# Patient Record
Sex: MALE | Race: WHITE | NOT HISPANIC OR LATINO | Employment: FULL TIME | URBAN - NONMETROPOLITAN AREA
[De-identification: names, ages, dates, MRNs, and addresses within clinical notes are randomized per-mention and may not be internally consistent; named-entity substitution may affect disease eponyms.]

---

## 2018-12-17 ENCOUNTER — APPOINTMENT (OUTPATIENT)
Dept: LAB | Facility: HOSPITAL | Age: 23
End: 2018-12-17

## 2019-02-23 ENCOUNTER — APPOINTMENT (OUTPATIENT)
Dept: GENERAL RADIOLOGY | Facility: HOSPITAL | Age: 24
End: 2019-02-23

## 2019-02-23 ENCOUNTER — HOSPITAL ENCOUNTER (EMERGENCY)
Facility: HOSPITAL | Age: 24
Discharge: HOME | End: 2019-02-23
Attending: STUDENT IN AN ORGANIZED HEALTH CARE EDUCATION/TRAINING PROGRAM | Admitting: STUDENT IN AN ORGANIZED HEALTH CARE EDUCATION/TRAINING PROGRAM

## 2019-02-23 VITALS
TEMPERATURE: 98 F | RESPIRATION RATE: 18 BRPM | OXYGEN SATURATION: 96 % | SYSTOLIC BLOOD PRESSURE: 111 MMHG | DIASTOLIC BLOOD PRESSURE: 62 MMHG | HEART RATE: 90 BPM

## 2019-02-23 DIAGNOSIS — S81.011A KNEE LACERATION, RIGHT, INITIAL ENCOUNTER: ICD-10-CM

## 2019-02-23 DIAGNOSIS — S42.292A FRACTURE OF HUMERAL HEAD, CLOSED, LEFT, INITIAL ENCOUNTER: ICD-10-CM

## 2019-02-23 DIAGNOSIS — S42.92XA TRAUMATIC CLOSED DISPLACED FRACTURE OF LEFT SHOULDER WITH ANTERIOR DISLOCATION, INITIAL ENCOUNTER: Primary | ICD-10-CM

## 2019-02-23 PROCEDURE — 90715 TDAP VACCINE 7 YRS/> IM: CPT | Performed by: STUDENT IN AN ORGANIZED HEALTH CARE EDUCATION/TRAINING PROGRAM

## 2019-02-23 PROCEDURE — 73030 X-RAY EXAM OF SHOULDER: CPT

## 2019-02-23 PROCEDURE — 25010000002 TDAP 5-2.5-18.5 LF-MCG/0.5 SUSPENSION: Performed by: STUDENT IN AN ORGANIZED HEALTH CARE EDUCATION/TRAINING PROGRAM

## 2019-02-23 PROCEDURE — 73562 X-RAY EXAM OF KNEE 3: CPT

## 2019-02-23 PROCEDURE — 90471 IMMUNIZATION ADMIN: CPT | Performed by: STUDENT IN AN ORGANIZED HEALTH CARE EDUCATION/TRAINING PROGRAM

## 2019-02-23 PROCEDURE — 99283 EMERGENCY DEPT VISIT LOW MDM: CPT

## 2019-02-23 RX ORDER — OXYCODONE HYDROCHLORIDE AND ACETAMINOPHEN 5; 325 MG/1; MG/1
1 TABLET ORAL ONCE
Status: COMPLETED | OUTPATIENT
Start: 2019-02-23 | End: 2019-02-23

## 2019-02-23 RX ORDER — OXYCODONE HYDROCHLORIDE AND ACETAMINOPHEN 5; 325 MG/1; MG/1
1 TABLET ORAL EVERY 6 HOURS PRN
Qty: 10 TABLET | Refills: 0 | OUTPATIENT
Start: 2019-02-23 | End: 2020-07-20

## 2019-02-23 RX ORDER — CEPHALEXIN 500 MG/1
500 CAPSULE ORAL 4 TIMES DAILY
Qty: 20 CAPSULE | Refills: 0 | OUTPATIENT
Start: 2019-02-23 | End: 2020-07-20

## 2019-02-23 RX ORDER — CEPHALEXIN 250 MG/1
500 CAPSULE ORAL ONCE
Status: COMPLETED | OUTPATIENT
Start: 2019-02-23 | End: 2019-02-23

## 2019-02-23 RX ORDER — ONDANSETRON 4 MG/1
4 TABLET, ORALLY DISINTEGRATING ORAL ONCE
Status: COMPLETED | OUTPATIENT
Start: 2019-02-23 | End: 2019-02-23

## 2019-02-23 RX ADMIN — OXYCODONE HYDROCHLORIDE AND ACETAMINOPHEN 1 TABLET: 5; 325 TABLET ORAL at 01:42

## 2019-02-23 RX ADMIN — ONDANSETRON 4 MG: 4 TABLET, ORALLY DISINTEGRATING ORAL at 01:43

## 2019-02-23 RX ADMIN — CEPHALEXIN 500 MG: 250 CAPSULE ORAL at 03:44

## 2019-02-23 RX ADMIN — TETANUS TOXOID, REDUCED DIPHTHERIA TOXOID AND ACELLULAR PERTUSSIS VACCINE, ADSORBED 0.5 ML: 5; 2.5; 8; 8; 2.5 SUSPENSION INTRAMUSCULAR at 03:45
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
## 2019-02-26 ENCOUNTER — OFFICE VISIT (OUTPATIENT)
Dept: ORTHOPEDIC SURGERY | Facility: CLINIC | Age: 24
End: 2019-02-26

## 2019-02-26 VITALS — BODY MASS INDEX: 35.65 KG/M2 | WEIGHT: 249 LBS | RESPIRATION RATE: 18 BRPM | HEIGHT: 70 IN

## 2019-02-26 DIAGNOSIS — S43.005A DISLOCATION OF LEFT SHOULDER JOINT, INITIAL ENCOUNTER: ICD-10-CM

## 2019-02-26 DIAGNOSIS — S42.252A CLOSED DISPLACED FRACTURE OF GREATER TUBEROSITY OF LEFT HUMERUS, INITIAL ENCOUNTER: ICD-10-CM

## 2019-02-26 DIAGNOSIS — R52 PAIN: Primary | ICD-10-CM

## 2019-02-26 PROCEDURE — 99204 OFFICE O/P NEW MOD 45 MIN: CPT | Performed by: ORTHOPAEDIC SURGERY

## 2019-02-26 RX ORDER — BENZONATATE 100 MG/1
100-200 CAPSULE ORAL
COMMUNITY
Start: 2016-07-08 | End: 2019-02-26

## 2019-02-26 RX ORDER — ALBUTEROL SULFATE 90 UG/1
2 AEROSOL, METERED RESPIRATORY (INHALATION)
COMMUNITY
Start: 2016-07-16 | End: 2019-02-26 | Stop reason: SDUPTHER
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
## 2019-03-11 DIAGNOSIS — S42.252A CLOSED DISPLACED FRACTURE OF GREATER TUBEROSITY OF LEFT HUMERUS, INITIAL ENCOUNTER: Primary | ICD-10-CM

## 2019-03-12 ENCOUNTER — OFFICE VISIT (OUTPATIENT)
Dept: ORTHOPEDIC SURGERY | Facility: CLINIC | Age: 24
End: 2019-03-12

## 2019-03-12 VITALS — HEIGHT: 70 IN | BODY MASS INDEX: 35.65 KG/M2 | WEIGHT: 249 LBS | RESPIRATION RATE: 18 BRPM

## 2019-03-12 DIAGNOSIS — S42.252D CLOSED DISPLACED FRACTURE OF GREATER TUBEROSITY OF LEFT HUMERUS WITH ROUTINE HEALING, SUBSEQUENT ENCOUNTER: Primary | ICD-10-CM

## 2019-03-12 PROCEDURE — 99213 OFFICE O/P EST LOW 20 MIN: CPT | Performed by: ORTHOPAEDIC SURGERY

## 2019-03-12 NOTE — PROGRESS NOTES
Subjective   Patient ID: Scott Louis is a 24 y.o. male  Follow-up and Pain of the Left Shoulder (Patient is here today for his CT results. He states there is not pain unless he tries to lift the arm.)             History of Present Illness  No pain left shoulder, had CT scan done at [facility] on March 4 which showed no associated glenoid fracture and minimal displacement of the greater tuberosity fracture.  He is very comfortable tolerating his light duty work.      Review of Systems   Constitutional: Negative for fever.   HENT: Negative for voice change.    Eyes: Negative for visual disturbance.   Respiratory: Negative for shortness of breath.    Cardiovascular: Negative for chest pain.   Gastrointestinal: Negative for abdominal distention and abdominal pain.   Genitourinary: Negative for dysuria.   Musculoskeletal: Positive for arthralgias. Negative for gait problem and joint swelling.   Skin: Negative for rash.   Neurological: Negative for speech difficulty.   Hematological: Does not bruise/bleed easily.   Psychiatric/Behavioral: Negative for confusion.       History reviewed. No pertinent past medical history.     History reviewed. No pertinent surgical history.    History reviewed. No pertinent family history.    Social History     Socioeconomic History   • Marital status: Single     Spouse name: Not on file   • Number of children: Not on file   • Years of education: Not on file   • Highest education level: Not on file   Social Needs   • Financial resource strain: Not on file   • Food insecurity - worry: Not on file   • Food insecurity - inability: Not on file   • Transportation needs - medical: Not on file   • Transportation needs - non-medical: Not on file   Occupational History   • Not on file   Tobacco Use   • Smoking status: Never Smoker   • Smokeless tobacco: Never Used   Substance and Sexual Activity   • Alcohol use: No     Frequency: Never   • Drug use: No   • Sexual activity: Defer   Other Topics  "Concern   • Not on file   Social History Narrative   • Not on file       I have reviewed all of the above social hx, family hx, surgical hx, medications, allergies & ROS and confirm that it is accurate.    No Known Allergies      Current Outpatient Medications:   •  cephalexin (KEFLEX) 500 MG capsule, Take 1 capsule by mouth 4 (Four) Times a Day., Disp: 20 capsule, Rfl: 0  •  oxyCODONE-acetaminophen (PERCOCET) 5-325 MG per tablet, Take 1 tablet by mouth Every 6 (Six) Hours As Needed for Severe Pain ., Disp: 10 tablet, Rfl: 0    Objective   Resp 18   Ht 177.8 cm (70\")   Wt 113 kg (249 lb)   BMI 35.73 kg/m²    Physical Exam  Constitutional: Patient is oriented to person, place, and time. Patient appears well-developed and well-nourished.   HENT:Head: Normocephalic and atraumatic.   Eyes: EOM are normal. Pupils are equal, round, and reactive to light.   Neck: Normal range of motion. Neck supple.   Cardiovascular: Normal rate.    Pulmonary/Chest: Effort normal and breath sounds normal.   Abdominal: Soft.   Neurological: Patient is alert and oriented to person, place, and time.   Skin: Skin is warm and dry.   Psychiatric: Patient has a normal mood and affect.   Nursing note and vitals reviewed.       [unfilled]   Left shoulder with minimal pain neurovascularly intact ecchymosis is resolving no associated neck pain paresthesias or other radicular signs    Assessment/Plan   Review of Radiographic Studies:    Radiographic images today of fracture I personally viewed and confirm satisfactory alignment.      Procedures     Scott was seen today for follow-up and pain.    Diagnoses and all orders for this visit:    Closed displaced fracture of greater tuberosity of left humerus with routine healing, subsequent encounter       Orthopedic activities reviewed and patient expressed appreciation, Risk, benefits, and merits of treatment alternatives reviewed with the patient and questions answered and Work status form completed " and provided to patient      Recommendations/Plan:   Work/Activity Status: No use of involved extremity    Patient agreeable to call or return sooner for any concerns.         Reviewed the patient's CT scan done at [facility] and explained to him the nature of the fracture natural history treatment options and reviewed expectations    Impression:  Status post left nondominant shoulder dislocation with minimally displaced comminuted greater tuberosity fracture  Plan:  Continue modified duty no use of left arm at work follow-up in 1 week x-ray left shoulder at that time then refer to physical therapy

## 2019-03-18 DIAGNOSIS — S42.252D CLOSED DISPLACED FRACTURE OF GREATER TUBEROSITY OF LEFT HUMERUS WITH ROUTINE HEALING, SUBSEQUENT ENCOUNTER: Primary | ICD-10-CM

## 2019-03-19 ENCOUNTER — OFFICE VISIT (OUTPATIENT)
Dept: ORTHOPEDIC SURGERY | Facility: CLINIC | Age: 24
End: 2019-03-19

## 2019-03-19 VITALS — WEIGHT: 249 LBS | BODY MASS INDEX: 35.65 KG/M2 | HEIGHT: 70 IN | RESPIRATION RATE: 18 BRPM

## 2019-03-19 DIAGNOSIS — S42.252D CLOSED DISPLACED FRACTURE OF GREATER TUBEROSITY OF LEFT HUMERUS WITH ROUTINE HEALING, SUBSEQUENT ENCOUNTER: Primary | ICD-10-CM

## 2019-03-19 PROCEDURE — 99213 OFFICE O/P EST LOW 20 MIN: CPT | Performed by: ORTHOPAEDIC SURGERY

## 2019-03-19 NOTE — PROGRESS NOTES
Subjective   Patient ID: Scott Louis is a 24 y.o. male  Follow-up and Pain of the Left Shoulder (Patient is here today for a 1 week follow up on his left shoulder fracture, he states the shoulder feels the same as it has been. His pain level is 5/10.)             History of Present Illness  Left shoulder pain much improved less bruising has been compliant with instructions doing modified duty.  Here for follow-up of his nondisplaced greater tuberosity fracture left shoulder      Review of Systems   Constitutional: Negative for fever.   HENT: Negative for voice change.    Eyes: Negative for visual disturbance.   Respiratory: Negative for shortness of breath.    Cardiovascular: Negative for chest pain.   Gastrointestinal: Negative for abdominal distention and abdominal pain.   Genitourinary: Negative for dysuria.   Musculoskeletal: Positive for arthralgias. Negative for gait problem and joint swelling.   Skin: Negative for rash.   Neurological: Negative for speech difficulty.   Hematological: Does not bruise/bleed easily.   Psychiatric/Behavioral: Negative for confusion.       History reviewed. No pertinent past medical history.     History reviewed. No pertinent surgical history.    History reviewed. No pertinent family history.    Social History     Socioeconomic History   • Marital status: Single     Spouse name: Not on file   • Number of children: Not on file   • Years of education: Not on file   • Highest education level: Not on file   Social Needs   • Financial resource strain: Not on file   • Food insecurity - worry: Not on file   • Food insecurity - inability: Not on file   • Transportation needs - medical: Not on file   • Transportation needs - non-medical: Not on file   Occupational History   • Not on file   Tobacco Use   • Smoking status: Never Smoker   • Smokeless tobacco: Never Used   Substance and Sexual Activity   • Alcohol use: No     Frequency: Never   • Drug use: No   • Sexual activity: Defer  "  Other Topics Concern   • Not on file   Social History Narrative   • Not on file       I have reviewed all of the above social hx, family hx, surgical hx, medications, allergies & ROS and confirm that it is accurate.    No Known Allergies      Current Outpatient Medications:   •  cephalexin (KEFLEX) 500 MG capsule, Take 1 capsule by mouth 4 (Four) Times a Day., Disp: 20 capsule, Rfl: 0  •  oxyCODONE-acetaminophen (PERCOCET) 5-325 MG per tablet, Take 1 tablet by mouth Every 6 (Six) Hours As Needed for Severe Pain ., Disp: 10 tablet, Rfl: 0    Objective   Resp 18   Ht 177.8 cm (70\")   Wt 113 kg (249 lb)   BMI 35.73 kg/m²    Physical Exam  Constitutional: Patient is oriented to person, place, and time. Patient appears well-developed and well-nourished.   HENT:Head: Normocephalic and atraumatic.   Eyes: EOM are normal. Pupils are equal, round, and reactive to light.   Neck: Normal range of motion. Neck supple.   Cardiovascular: Normal rate.    Pulmonary/Chest: Effort normal and breath sounds normal.   Abdominal: Soft.   Neurological: Patient is alert and oriented to person, place, and time.   Skin: Skin is warm and dry.   Psychiatric: Patient has a normal mood and affect.   Nursing note and vitals reviewed.       [unfilled]   Left shoulder with minimal tenderness less ecchymosis no swelling no tenderness AC joint or proximal biceps tendon left arm neurovascularly intact    Assessment/Plan   Review of Radiographic Studies:    Radiographic images today of fracture I personally viewed and confirm satisfactory alignment.      Procedures     Scott was seen today for follow-up and pain.    Diagnoses and all orders for this visit:    Closed displaced fracture of greater tuberosity of left humerus with routine healing, subsequent encounter  -     Ambulatory Referral to Physical Therapy Evaluate and treat       Physical therapy referral given      Recommendations/Plan:   Work/Activity Status: No use of involved " extremity    Patient agreeable to call or return sooner for any concerns.             Impression:  Healing left proximal humeral minimally displaced slightly comminuted greater tuberosity fracture status post dislocation    Plan: therapy referral, no use of left arm at work, recheck in 4-6 weeks x-rays left shoulder at that time

## 2019-04-24 DIAGNOSIS — S42.252D CLOSED DISPLACED FRACTURE OF GREATER TUBEROSITY OF LEFT HUMERUS WITH ROUTINE HEALING, SUBSEQUENT ENCOUNTER: Primary | ICD-10-CM

## 2019-04-25 ENCOUNTER — OFFICE VISIT (OUTPATIENT)
Dept: ORTHOPEDIC SURGERY | Facility: CLINIC | Age: 24
End: 2019-04-25

## 2019-04-25 VITALS — WEIGHT: 253 LBS | BODY MASS INDEX: 36.22 KG/M2 | HEIGHT: 70 IN | RESPIRATION RATE: 18 BRPM

## 2019-04-25 DIAGNOSIS — S42.252D CLOSED DISPLACED FRACTURE OF GREATER TUBEROSITY OF LEFT HUMERUS WITH ROUTINE HEALING, SUBSEQUENT ENCOUNTER: Primary | ICD-10-CM

## 2019-04-25 PROCEDURE — 99213 OFFICE O/P EST LOW 20 MIN: CPT | Performed by: ORTHOPAEDIC SURGERY

## 2019-04-25 NOTE — PROGRESS NOTES
Subjective   Patient ID: Scott Louis is a 24 y.o. male  Follow-up of the Left Shoulder (Patient is here today for a follow up on his left shoulder fracture, he denies any pain today.)             History of Present Illness    Pain level much improved reached full range of motion within the last week progressing with strength but still feels weakness with external rotation of the left upper arm.    Review of Systems   Constitutional: Negative for fever.   HENT: Negative for voice change.    Eyes: Negative for visual disturbance.   Respiratory: Negative for shortness of breath.    Cardiovascular: Negative for chest pain.   Gastrointestinal: Negative for abdominal distention and abdominal pain.   Genitourinary: Negative for dysuria.   Musculoskeletal: Negative for arthralgias, gait problem and joint swelling.   Skin: Negative for rash.   Neurological: Negative for speech difficulty.   Hematological: Does not bruise/bleed easily.   Psychiatric/Behavioral: Negative for confusion.       History reviewed. No pertinent past medical history.     History reviewed. No pertinent surgical history.    History reviewed. No pertinent family history.    Social History     Socioeconomic History   • Marital status: Single     Spouse name: Not on file   • Number of children: Not on file   • Years of education: Not on file   • Highest education level: Not on file   Tobacco Use   • Smoking status: Never Smoker   • Smokeless tobacco: Never Used   Substance and Sexual Activity   • Alcohol use: No     Frequency: Never   • Drug use: No   • Sexual activity: Defer       I have reviewed all of the above social hx, family hx, surgical hx, medications, allergies & ROS and confirm that it is accurate.    No Known Allergies      Current Outpatient Medications:   •  cephalexin (KEFLEX) 500 MG capsule, Take 1 capsule by mouth 4 (Four) Times a Day., Disp: 20 capsule, Rfl: 0  •  oxyCODONE-acetaminophen (PERCOCET) 5-325 MG per tablet, Take 1 tablet by  "mouth Every 6 (Six) Hours As Needed for Severe Pain ., Disp: 10 tablet, Rfl: 0    Objective   Resp 18   Ht 177.8 cm (70\")   Wt 115 kg (253 lb)   BMI 36.30 kg/m²    Physical Exam  Constitutional: Patient is oriented to person, place, and time. Patient appears well-developed and well-nourished.   HENT:Head: Normocephalic and atraumatic.   Eyes: EOM are normal. Pupils are equal, round, and reactive to light.   Neck: Normal range of motion. Neck supple.   Cardiovascular: Normal rate.    Pulmonary/Chest: Effort normal and breath sounds normal.   Abdominal: Soft.   Neurological: Patient is alert and oriented to person, place, and time.   Skin: Skin is warm and dry.   Psychiatric: Patient has a normal mood and affect.   Nursing note and vitals reviewed.       [unfilled]   Left shoulder: Full active range of motion, only grade 4+ strength with supraspinatus resistance, infraspinatus teres minor subscap L5 over 5.    Assessment/Plan   Review of Radiographic Studies:    Indication to evaluate fracture healing, and compared with prior imaging, shows interm fracture healing, callus formation and or periostitis in continued good position and alignment.      Procedures     Scott was seen today for follow-up.    Diagnoses and all orders for this visit:    Closed displaced fracture of greater tuberosity of left humerus with routine healing, subsequent encounter       Physical therapy referral given and Work status form completed and provided to patient      Recommendations/Plan:   Work/Activity Status: Continue light duty restrictions for 3 more weeks    Patient agreeable to call or return sooner for any concerns.             Impression:  Healed left nondominant shoulder greater tuberosity minimally displaced avulsion fracture status post dislocation  Plan:  Continue work duty restrictions 3 weeks recheck at that point no x-rays probable released to full duty work depending on progress with therapy  "

## 2019-05-15 DIAGNOSIS — S42.252D CLOSED DISPLACED FRACTURE OF GREATER TUBEROSITY OF LEFT HUMERUS WITH ROUTINE HEALING, SUBSEQUENT ENCOUNTER: Primary | ICD-10-CM

## 2019-05-16 ENCOUNTER — OFFICE VISIT (OUTPATIENT)
Dept: ORTHOPEDIC SURGERY | Facility: CLINIC | Age: 24
End: 2019-05-16

## 2019-05-16 VITALS — RESPIRATION RATE: 18 BRPM | BODY MASS INDEX: 36.22 KG/M2 | HEIGHT: 70 IN | WEIGHT: 253 LBS

## 2019-05-16 DIAGNOSIS — S42.252D CLOSED DISPLACED FRACTURE OF GREATER TUBEROSITY OF LEFT HUMERUS WITH ROUTINE HEALING, SUBSEQUENT ENCOUNTER: Primary | ICD-10-CM

## 2019-05-16 PROCEDURE — 99213 OFFICE O/P EST LOW 20 MIN: CPT | Performed by: ORTHOPAEDIC SURGERY

## 2019-05-16 NOTE — PROGRESS NOTES
Subjective   Patient ID: Scott Louis is a 24 y.o. male  Follow-up of the Left Shoulder (Patient is here today for a follow up on his left shoulder, he states he doing great and denies any pain.)             History of Present Illness  Patient returns 3 months status post minimally displaced greater tuberosity fracture left nondominant shoulder, he feels minimal pain full range feels no difference in strength from left to right shoulder and would like to return to his regular work duty.      Review of Systems   Constitutional: Negative for fever.   HENT: Negative for voice change.    Eyes: Negative for visual disturbance.   Respiratory: Negative for shortness of breath.    Cardiovascular: Negative for chest pain.   Gastrointestinal: Negative for abdominal distention and abdominal pain.   Genitourinary: Negative for dysuria.   Musculoskeletal: Negative for arthralgias, gait problem and joint swelling.   Skin: Negative for rash.   Neurological: Negative for speech difficulty.   Hematological: Does not bruise/bleed easily.   Psychiatric/Behavioral: Negative for confusion.       History reviewed. No pertinent past medical history.     History reviewed. No pertinent surgical history.    History reviewed. No pertinent family history.    Social History     Socioeconomic History   • Marital status: Single     Spouse name: Not on file   • Number of children: Not on file   • Years of education: Not on file   • Highest education level: Not on file   Tobacco Use   • Smoking status: Never Smoker   • Smokeless tobacco: Never Used   Substance and Sexual Activity   • Alcohol use: No     Frequency: Never   • Drug use: No   • Sexual activity: Defer       I have reviewed all of the above social hx, family hx, surgical hx, medications, allergies & ROS and confirm that it is accurate.    No Known Allergies      Current Outpatient Medications:   •  cephalexin (KEFLEX) 500 MG capsule, Take 1 capsule by mouth 4 (Four) Times a Day., Disp:  "20 capsule, Rfl: 0  •  oxyCODONE-acetaminophen (PERCOCET) 5-325 MG per tablet, Take 1 tablet by mouth Every 6 (Six) Hours As Needed for Severe Pain ., Disp: 10 tablet, Rfl: 0    Objective   Resp 18   Ht 177.8 cm (70\")   Wt 115 kg (253 lb)   BMI 36.30 kg/m²    Physical Exam  Constitutional: Patient is oriented to person, place, and time. Patient appears well-developed and well-nourished.   HENT:Head: Normocephalic and atraumatic.   Eyes: EOM are normal. Pupils are equal, round, and reactive to light.   Neck: Normal range of motion. Neck supple.   Cardiovascular: Normal rate.    Pulmonary/Chest: Effort normal and breath sounds normal.   Abdominal: Soft.   Neurological: Patient is alert and oriented to person, place, and time.   Skin: Skin is warm and dry.   Psychiatric: Patient has a normal mood and affect.   Nursing note and vitals reviewed.       [unfilled]   Left shoulder: Full active range of motion no signs of impingement instability or focal weakness, strength at the supraspinatus infraspinous grade 5.  Left arm neurovascularly intact    Assessment/Plan   Review of Radiographic Studies:    No new imaging done today.      Procedures     Scott was seen today for follow-up.    Diagnoses and all orders for this visit:    Closed displaced fracture of greater tuberosity of left humerus with routine healing, subsequent encounter       Orthopedic activities reviewed and patient expressed appreciation and Work status form completed and provided to patient      Recommendations/Plan:   Work/Activity Status: May return to sports and physical work as tolerated    Patient agreeable to call or return sooner for any concerns.         Discussed with the patient home exercise progression activities and appropriate measures for determining/improving strength and endurance for his left shoulder    Impression:  Healed left nondominant shoulder greater tuberosity fracture with full range of motion good strength  Plan:  Resume " normal work duties return when necessary

## 2019-11-04 ENCOUNTER — APPOINTMENT (OUTPATIENT)
Dept: LAB | Facility: HOSPITAL | Age: 24
End: 2019-11-04

## 2020-07-20 ENCOUNTER — HOSPITAL ENCOUNTER (EMERGENCY)
Facility: HOSPITAL | Age: 25
Discharge: HOME | End: 2020-07-20
Attending: EMERGENCY MEDICINE | Admitting: EMERGENCY MEDICINE

## 2020-07-20 ENCOUNTER — APPOINTMENT (OUTPATIENT)
Dept: GENERAL RADIOLOGY | Facility: HOSPITAL | Age: 25
End: 2020-07-20

## 2020-07-20 VITALS
OXYGEN SATURATION: 96 % | RESPIRATION RATE: 18 BRPM | HEIGHT: 71 IN | HEART RATE: 103 BPM | DIASTOLIC BLOOD PRESSURE: 92 MMHG | WEIGHT: 245 LBS | SYSTOLIC BLOOD PRESSURE: 141 MMHG | TEMPERATURE: 98.7 F | BODY MASS INDEX: 34.3 KG/M2

## 2020-07-20 DIAGNOSIS — T07.XXXA MULTIPLE ABRASIONS: ICD-10-CM
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
## 2020-07-20 PROCEDURE — 73562 X-RAY EXAM OF KNEE 3: CPT

## 2020-07-20 PROCEDURE — 99283 EMERGENCY DEPT VISIT LOW MDM: CPT

## 2020-07-20 PROCEDURE — 73080 X-RAY EXAM OF ELBOW: CPT

## 2020-07-20 RX ORDER — BACITRACIN ZINC 500 [USP'U]/G
OINTMENT TOPICAL ONCE
Status: COMPLETED | OUTPATIENT
Start: 2020-07-20 | End: 2020-07-20

## 2020-07-20 RX ADMIN — BACITRACIN ZINC: 500 OINTMENT TOPICAL at 07:06
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
## 2024-02-20 ENCOUNTER — HOSPITAL ENCOUNTER (EMERGENCY)
Facility: HOSPITAL | Age: 29
Discharge: HOME | End: 2024-02-20
Attending: EMERGENCY MEDICINE | Admitting: EMERGENCY MEDICINE
Payer: COMMERCIAL

## 2024-02-20 VITALS
BODY MASS INDEX: 35.79 KG/M2 | TEMPERATURE: 97.8 F | HEART RATE: 88 BPM | DIASTOLIC BLOOD PRESSURE: 109 MMHG | SYSTOLIC BLOOD PRESSURE: 165 MMHG | HEIGHT: 70 IN | RESPIRATION RATE: 18 BRPM | WEIGHT: 250 LBS | OXYGEN SATURATION: 95 %

## 2024-02-20 DIAGNOSIS — M54.41 ACUTE RIGHT-SIDED LOW BACK PAIN WITH RIGHT-SIDED SCIATICA: Primary | ICD-10-CM

## 2024-02-20 PROCEDURE — 25010000002 ORPHENADRINE CITRATE PER 60 MG: Performed by: PHYSICIAN ASSISTANT

## 2024-02-20 PROCEDURE — 99282 EMERGENCY DEPT VISIT SF MDM: CPT

## 2024-02-20 PROCEDURE — 25010000002 KETOROLAC TROMETHAMINE PER 15 MG: Performed by: PHYSICIAN ASSISTANT

## 2024-02-20 PROCEDURE — 96372 THER/PROPH/DIAG INJ SC/IM: CPT

## 2024-02-20 RX ORDER — LIDOCAINE 50 MG/G
1 PATCH TOPICAL EVERY 24 HOURS
Qty: 15 PATCH | Refills: 0 | Status: SHIPPED | OUTPATIENT
Start: 2024-02-20

## 2024-02-20 RX ORDER — PREDNISONE 20 MG/1
TABLET ORAL
Qty: 20 TABLET | Refills: 0 | Status: SHIPPED | OUTPATIENT
Start: 2024-02-20 | End: 2024-03-02

## 2024-02-20 RX ORDER — KETOROLAC TROMETHAMINE 30 MG/ML
60 INJECTION, SOLUTION INTRAMUSCULAR; INTRAVENOUS ONCE
Status: COMPLETED | OUTPATIENT
Start: 2024-02-20 | End: 2024-02-20

## 2024-02-20 RX ORDER — ORPHENADRINE CITRATE 30 MG/ML
60 INJECTION INTRAMUSCULAR; INTRAVENOUS ONCE
Status: COMPLETED | OUTPATIENT
Start: 2024-02-20 | End: 2024-02-20

## 2024-02-20 RX ORDER — CYCLOBENZAPRINE HCL 10 MG
10 TABLET ORAL 3 TIMES DAILY PRN
Qty: 21 TABLET | Refills: 0 | Status: SHIPPED | OUTPATIENT
Start: 2024-02-20

## 2024-02-20 RX ADMIN — KETOROLAC TROMETHAMINE 60 MG: 30 INJECTION, SOLUTION INTRAMUSCULAR; INTRAVENOUS at 17:57

## 2024-02-20 RX ADMIN — ORPHENADRINE CITRATE 60 MG: 60 INJECTION INTRAMUSCULAR; INTRAVENOUS at 17:57

## 2024-02-20 NOTE — Clinical Note
[facility] EMERGENCY DEPARTMENT  [address] [address]  Phone: [phone]    Scott Louis was seen and treated in our emergency department on 2/20/2024.  He may return to work on 02/22/2024.         Thank you for choosing [facility].    Alfred Stallworth PA-C

## 2024-02-20 NOTE — ED PROVIDER NOTES
Subjective  History of Present Illness:    Chief Complaint:   Chief Complaint   Patient presents with    Back Pain      History of Present Illness: Scott Louis is a 29 y.o. male who presents to the emergency department complaining of right-sided low back pain.  Patient states a week ago had some mild right low back pain that is worsened since.  Was getting better until today moved to chair and had worsening of the pain again.  It radiates down his right lateral thigh.  No bowel or bladder incontinence or saddle anesthesias.  No midline pain.  No direct trauma has only tried ibuprofen occasionally since onset  Onset: 1 week ago  Duration: Ongoing  Exacerbating / Alleviating factors: Worse with palpation and movement  Associated symptoms: None      Nurses Notes reviewed and agree, including vitals, allergies, social history and prior medical history.     Review of Systems   Constitutional: Negative.    HENT: Negative.     Eyes: Negative.    Respiratory: Negative.     Cardiovascular: Negative.    Gastrointestinal: Negative.    Genitourinary: Negative.    Musculoskeletal:  Positive for back pain.   Skin: Negative.    Allergic/Immunologic: Negative.    Neurological: Negative.    Psychiatric/Behavioral: Negative.     All other systems reviewed and are negative.      History reviewed. No pertinent past medical history.    Allergies:    Patient has no known allergies.      History reviewed. No pertinent surgical history.      Social History     Socioeconomic History    Marital status: Single   Tobacco Use    Smoking status: Never    Smokeless tobacco: Never   Vaping Use    Vaping Use: Never used   Substance and Sexual Activity    Alcohol use: Yes     Comment: socially     Drug use: No    Sexual activity: Defer         History reviewed. No pertinent family history.    Objective  Physical Exam:  BP (!) 176/120 (BP Location: Left arm, Patient Position: Sitting)   Pulse 100   Temp 97.8 °F (36.6 °C) (Oral)   Resp 18   Ht  "177.8 cm (70\")   Wt 113 kg (250 lb)   SpO2 92%   BMI 35.87 kg/m²      Physical Exam  Vitals and nursing note reviewed.   Constitutional:       General: He is not in acute distress.     Appearance: Normal appearance. He is obese. He is not ill-appearing, toxic-appearing or diaphoretic.   HENT:      Head: Normocephalic and atraumatic.      Nose: Nose normal.   Eyes:      Extraocular Movements: Extraocular movements intact.   Cardiovascular:      Rate and Rhythm: Normal rate and regular rhythm.   Pulmonary:      Effort: Pulmonary effort is normal.   Abdominal:      General: Abdomen is flat.   Musculoskeletal:         General: Normal range of motion.      Cervical back: Normal range of motion.        Back:       Comments: Tenderness to the right low back.  No lumbar vertebral tenderness   Skin:     General: Skin is warm and dry.   Neurological:      General: No focal deficit present.      Mental Status: He is alert and oriented to person, place, and time. Mental status is at baseline.   Psychiatric:         Mood and Affect: Mood normal.         Behavior: Behavior normal.           Procedures    ED Course:         Lab Results (last 24 hours)       ** No results found for the last 24 hours. **             No radiology results from the last 24 hrs                          Medical Decision Making  Risk  Prescription drug management.              Scott Louis is a 29 y.o. male who presents to the emergency department for evaluation of right-sided low back pain radiating down right lateral thigh    Differential diagnosis includes muscle strain, spasm, sciatica among other etiologies.    Chart review if available included outside testing, previous visits, prior labs, prior imaging, available notes from prior evaluations or visits with specialists, medication list, allergies, past medical history, past surgical history when applicable.    Patient was treated with   Medications   ketorolac (TORADOL) injection 60 mg (60 mg " Intramuscular Given 2/20/24 1757)   orphenadrine (NORFLEX) injection 60 mg (60 mg Intramuscular Given 2/20/24 1757)       Patient symptoms most consistent with musculoskeletal pain and sciatica.  No direct trauma of the lumbar spine.  No midline vertebral tenderness.  No red flag symptoms.    Plan for disposition is discharged home.  Patient/family comfortable with and understanding of the plan.      Final diagnoses:   Acute right-sided low back pain with right-sided sciatica          Alfred Stallworth PA-C  02/20/24 6981